# Patient Record
Sex: MALE | NOT HISPANIC OR LATINO | Employment: FULL TIME | URBAN - METROPOLITAN AREA
[De-identification: names, ages, dates, MRNs, and addresses within clinical notes are randomized per-mention and may not be internally consistent; named-entity substitution may affect disease eponyms.]

---

## 2020-09-03 ENCOUNTER — HOSPITAL ENCOUNTER (OUTPATIENT)
Dept: OTHER | Facility: HOSPITAL | Age: 20
Discharge: HOME | End: 2020-09-03
Attending: FAMILY MEDICINE

## 2020-09-03 ENCOUNTER — CONVERSION ENCOUNTER (OUTPATIENT)
Dept: FAMILY MEDICINE CLINIC | Age: 20
End: 2020-09-03

## 2020-09-06 LAB — SARS-COV-2 RNA SPEC QL NAA+PROBE: DETECTED

## 2021-03-02 ENCOUNTER — OFFICE VISIT CONVERTED (OUTPATIENT)
Dept: FAMILY MEDICINE CLINIC | Age: 21
End: 2021-03-02
Attending: FAMILY MEDICINE

## 2021-03-02 ENCOUNTER — HOSPITAL ENCOUNTER (OUTPATIENT)
Dept: OTHER | Facility: HOSPITAL | Age: 21
Discharge: HOME | End: 2021-03-02
Attending: FAMILY MEDICINE

## 2021-03-02 LAB
ALBUMIN SERPL-MCNC: 4.7 G/DL (ref 3.5–5)
ALBUMIN/GLOB SERPL: 1.7 {RATIO} (ref 1.4–2.6)
ALP SERPL-CCNC: 83 U/L (ref 53–128)
ALT SERPL-CCNC: 19 U/L (ref 10–40)
ANION GAP SERPL CALC-SCNC: 16 MMOL/L (ref 8–19)
AST SERPL-CCNC: 18 U/L (ref 15–50)
BILIRUB SERPL-MCNC: 0.39 MG/DL (ref 0.2–1.3)
BUN SERPL-MCNC: 7 MG/DL (ref 5–25)
BUN/CREAT SERPL: 7 {RATIO} (ref 6–20)
CALCIUM SERPL-MCNC: 9.4 MG/DL (ref 8.7–10.4)
CHLORIDE SERPL-SCNC: 105 MMOL/L (ref 99–111)
CONV CO2: 27 MMOL/L (ref 22–32)
CONV TOTAL PROTEIN: 7.4 G/DL (ref 6.3–8.2)
CREAT UR-MCNC: 0.94 MG/DL (ref 0.7–1.2)
ERYTHROCYTE [DISTWIDTH] IN BLOOD BY AUTOMATED COUNT: 11.9 % (ref 11.6–14.4)
GFR SERPLBLD BASED ON 1.73 SQ M-ARVRAT: >60 ML/MIN/{1.73_M2}
GLOBULIN UR ELPH-MCNC: 2.7 G/DL (ref 2–3.5)
GLUCOSE SERPL-MCNC: 104 MG/DL (ref 70–99)
HCT VFR BLD AUTO: 45.3 % (ref 42–52)
HGB BLD-MCNC: 15.3 G/DL (ref 14–18)
MCH RBC QN AUTO: 29.7 PG (ref 27–31)
MCHC RBC AUTO-ENTMCNC: 33.8 G/DL (ref 33–37)
MCV RBC AUTO: 88 FL (ref 80–96)
OSMOLALITY SERPL CALC.SUM OF ELEC: 296 MOSM/KG (ref 273–304)
PLATELET # BLD AUTO: 323 10*3/UL (ref 130–400)
PMV BLD AUTO: 10 FL (ref 9.4–12.4)
POTASSIUM SERPL-SCNC: 4.1 MMOL/L (ref 3.5–5.3)
RBC # BLD AUTO: 5.15 10*6/UL (ref 4.7–6.1)
SODIUM SERPL-SCNC: 144 MMOL/L (ref 135–147)
TSH SERPL-ACNC: 1.16 M[IU]/L (ref 0.27–4.2)
WBC # BLD AUTO: 6.27 10*3/UL (ref 4.8–10.8)

## 2021-05-18 NOTE — PROGRESS NOTES
Geoffrey Jimenez Nuno  2000     Office/Outpatient Visit    Visit Date: Tue, Mar 2, 2021 04:02 pm    Provider: Cyrus Lobato MD (Assistant: Roger Stevenson, )    Location: [facility]        Electronically signed by Cyrus Lobato MD on  03/04/2021 02:36:49 PM                             Subjective:        CC: Mr. Jimenez is a 21 year old White male.  This is his first visit to the clinic.  depression for a long time         HPI:           PHQ-9 Depression Screening: Completed form scanned and in chart; Total Score 6           In regard to the dysthymic disorder, the diagnosis of depression was made several years ago.  Presently, he feels a moderate degree of depression.  Current affective symptoms include anhedonia.  Presently, Mr. Jimenez denies suicidal ideation.  STATES HE HAS FELT THIS WAY FOR A LONG TIME BUT HAS NEVER SOUGHT TREATMENT     ROS:     CONSTITUTIONAL:  Positive for fatigue.   Negative for chills or fever.      E/N/T:  Negative for ear pain, nasal congestion and sore throat.      CARDIOVASCULAR:  Negative for chest pain and palpitations.      RESPIRATORY:  Negative for recent cough and dyspnea.      GASTROINTESTINAL:  Negative for abdominal pain, nausea and vomiting.      MUSCULOSKELETAL:  Negative for myalgias.      NEUROLOGICAL:  Negative for headaches.          Past Medical History / Family History / Social History:         Last Reviewed on 3/04/2021 02:20 PM by Cyrus Lobato    Past Medical History:             PAST MEDICAL HISTORY     UNREMARKABLE         Surgical History:     NONE         Family History:     Family Medical History Unremarkable         Social History:     Occupation: CONSTRUCTION WORK;         Tobacco/Alcohol/Supplements:     Last Reviewed on 3/04/2021 02:20 PM by Cyrus Lobato    Tobacco: He has never smoked.          Alcohol: Frequency: Just on weekends When he drinks, the average quantity of alcohol is 7-8 drinks.       Caffeine:  He admits to consuming caffeine via -LITTLE.          Substance Abuse History:     Last Reviewed on 3/04/2021 02:20 PM by Cyrus Lobato    None         Mental Health History:     Last Reviewed on 3/07/2016 02:56 PM by Laura Dietrich        Communicable Diseases (eg STDs):     Last Reviewed on 3/07/2016 02:56 PM by Laura Dietrich        Current Problems:     Last Reviewed on 3/04/2021 02:20 PM by Cyrus Lobato    Dysthymic disorder    Other fatigue    Encounter for screening for depression        Immunizations:     DTaP 1998    DTaP 1998    DTaP 9/15/1999    DTaP 1998    DTaP 2/14/2003    Hib HbOC (4-dose) 1998    Hib HbOC (4-dose) 1998    Hib HbOC (4-dose) 9/15/1999    Hib HbOC (4-dose) 1998    Hep B (pedi/adol, 3-dose schedule) 1998    Hep B (pedi/adol, 3-dose schedule) 1998    Hep B (pedi/adol, 3-dose schedule) 1998    IPV  Poliovirus, inactivated 1998    IPV  Poliovirus, inactivated 1998    IPV  Poliovirus, inactivated 1/11/1999    IPV  Poliovirus, inactivated 2/14/2003    MMR  (Measles-Mumps-Rubella), live 1/11/1999    MMR  (Measles-Mumps-Rubella), live 2/14/2003    Varicella, live 1/11/1999    Menactra (Meningococcal MCV4P) 2/5/2010    Tdap (Tetanus, reduced diph, acellular pertussis) 2/5/2010        Allergies:     Last Reviewed on 3/04/2021 02:20 PM by Cyrus Lobato    No Known Allergies.        Current Medications:     Last Reviewed on 3/04/2021 02:20 PM by Cyrus Lobato    No Known Medications.        Objective:        Vitals:         Current: 3/2/2021 4:09:58 PM    Ht:  5 ft, 9.25 in;  Wt: 193.8 lbs;  BMI: 28.4T: 97.3 F (temporal);  BP: 162/89 mm Hg (left arm, sitting);  P: 72 bpm (left arm (BP Cuff), sitting)        Repeat:     4:11:13 PM  BP:   163/93mm Hg (left arm, sitting) 4:11:21 PM  P:   75bpm (left arm (BP Cuff), sitting)     Exams:     PHYSICAL EXAM:     GENERAL: Vitals recorded well developed, well nourished;   well groomed;  no apparent distress;     NECK: trachea is midline; thyroid is non-palpable;     RESPIRATORY: normal respiratory rate and pattern with no distress; normal breath sounds with no rales, rhonchi, wheezes or rubs;     CARDIOVASCULAR: normal rate; rhythm is regular;  no systolic murmur; no edema;     LYMPHATIC: no enlargement of cervical or facial nodes; no supraclavicular nodes;     NEUROLOGIC: GROSSLY INTACT     PSYCHIATRIC:  appropriate affect and demeanor; normal speech pattern; grossly normal memory;         Assessment:         F34.1   Dysthymic disorder       R53.83   Other fatigue       R03.0   Elevated blood-pressure reading, without diagnosis of hypertension       Z13.31   Encounter for screening for depression           ORDERS:         Meds Prescribed:       [New Rx] sertraline 50 mg oral tablet [take 1 tablet (50 mg) by oral route once daily], #30 (thirty) tablets, Refills: 0 (zero)         Lab Orders:       [id number]  [facility] CBC w/o diff  (Send-Out)            [id number]  COMP - [facility] Comp. Metabolic Panel  (Send-Out)            [id number]  TSH - [facility] TSH  (Send-Out)              Other Orders:         Depression screen positive and follow up plan documented  (In-House)                      Plan:         Dysthymic disorder        MEDICATIONS: NEW prescriptions an antidepressant     FOLLOW-UP: Schedule a follow-up visit in 3 months.      CONSIDER REFERRAL FOR COUNSELING, DECLINES AT THIS TIME MIPS Positive Depression Screen: Suicide Risk Assessment completed--denies suicidal/homicidal ideation; Pharmacologic intervention initiated/modified           Prescriptions:       [New Rx] sertraline 50 mg oral tablet [take 1 tablet (50 mg) by oral route once daily], #30 (thirty) tablets, Refills: 0 (zero)           Orders:         Depression screen positive and follow up plan documented  (In-House)              Other fatigue    LABORATORY:  Labs ordered to be performed today include CBC W/O DIFF,  Comprehensive metabolic panel, and TSH.            Orders:       [id number]  [facility] - [facility] CBC w/o diff  (Send-Out)            [id number]  COMP - [facility] Comp. Metabolic Panel  (Send-Out)            15142  TSH - [facility] TSH  (Send-Out)              Elevated blood-pressure reading, without diagnosis of hypertension        WILL RECHECK AT NEXT VISIT             Patient Recommendations:        For  Dysthymic disorder:    Schedule a follow-up visit in 3 months.          For  Elevated blood-pressure reading, without diagnosis of hypertension:    I also recommend WILL RECHECK AT NEXT VISIT.              Charge Capture:         Primary Diagnosis:     F34.1  Dysthymic disorder           Orders:      60160  Office visit - new pt, level 3  (In-House)              Depression screen positive and follow up plan documented  (In-House)              R53.83  Other fatigue     R03.0  Elevated blood-pressure reading, without diagnosis of hypertension     Z13.31  Encounter for screening for depression         ADDENDUMS:      ____________________________________    Addendum: 03/07/2021 03:46 PM - Cyrus Lobato        ADD [id number], REMOVE [id number]

## 2021-07-02 VITALS — HEIGHT: 71 IN | TEMPERATURE: 101 F

## 2021-07-02 VITALS
DIASTOLIC BLOOD PRESSURE: 93 MMHG | WEIGHT: 193.8 LBS | HEIGHT: 69 IN | SYSTOLIC BLOOD PRESSURE: 163 MMHG | BODY MASS INDEX: 28.71 KG/M2 | HEART RATE: 75 BPM | TEMPERATURE: 97.3 F

## 2021-08-11 ENCOUNTER — OFFICE VISIT (OUTPATIENT)
Dept: FAMILY MEDICINE CLINIC | Age: 21
End: 2021-08-11

## 2021-08-11 VITALS
SYSTOLIC BLOOD PRESSURE: 157 MMHG | DIASTOLIC BLOOD PRESSURE: 90 MMHG | HEIGHT: 70 IN | WEIGHT: 207.6 LBS | HEART RATE: 77 BPM | BODY MASS INDEX: 29.72 KG/M2

## 2021-08-11 DIAGNOSIS — Z00.00 ENCOUNTER FOR GENERAL ADULT MEDICAL EXAMINATION WITHOUT ABNORMAL FINDINGS: Primary | ICD-10-CM

## 2021-08-11 PROCEDURE — 99395 PREV VISIT EST AGE 18-39: CPT | Performed by: FAMILY MEDICINE

## 2021-08-11 NOTE — PROGRESS NOTES
"Chief Complaint  Anxiety (MED FOLLOW UP )    Subjective          Geoffrey Jimenez presents to [facility]  ANNUAL EXAM:  LAST EXAM ONE YEAR AGO.  DOES NOT SMOKE.  ANXIETY IS UNDER GOOD CONTROL ON CURRENT MEDS.  HIS FATIGUE IMPROVED.          No Known Allergies     Health Maintenance Due   Topic Date Due   • ANNUAL PHYSICAL  Never done   • COVID-19 Vaccine (1) Never done   • TDAP/TD VACCINES (2 - Td or Tdap) 02/05/2020   • HEPATITIS C SCREENING  Never done        Current Outpatient Medications on File Prior to Visit   Medication Sig   • sertraline (ZOLOFT) 50 MG tablet TAKE 1 TABLET(50 MG) BY MOUTH EVERY DAY     No current facility-administered medications on file prior to visit.       Immunization History   Administered Date(s) Administered   • DTaP 1998, 1998, 1998, 09/15/1999, 02/14/2003   • Hepatitis B 1998, 1998, 1998   • HiB 1998, 1998, 1998, 09/15/1999   • IPV 1998, 1998, 01/11/1999, 02/14/2003   • MMR 01/11/1999, 02/14/2003   • Meningococcal MCV4P (Menactra) 02/05/2010   • Tdap 02/05/2010   • Varicella 01/11/1999       Review of Systems   Constitutional: Negative for appetite change, chills, fatigue and fever.   HENT: Negative for ear pain, hearing loss, rhinorrhea and sore throat.    Eyes: Negative for blurred vision and discharge.   Respiratory: Negative for cough and shortness of breath.    Cardiovascular: Negative for chest pain, palpitations and leg swelling.   Gastrointestinal: Negative for abdominal pain, nausea and vomiting.   Genitourinary: Negative for dysuria and hematuria.   Musculoskeletal: Negative for arthralgias and myalgias.   Neurological: Negative for headache.   Psychiatric/Behavioral: Negative for depressed mood.        Objective     /90 (BP Location: Right arm, Patient Position: Sitting)   Pulse 77   Ht 176.5 cm (69.5\")   Wt 94.2 kg (207 lb 9.6 oz)   BMI 30.22 kg/m²   "     Physical Exam  Vitals and nursing note reviewed.   Constitutional:       General: He is not in acute distress.     Appearance: Normal appearance.   HENT:      Right Ear: Tympanic membrane normal.      Left Ear: Tympanic membrane normal.      Mouth/Throat:      Pharynx: Oropharynx is clear.   Eyes:      Conjunctiva/sclera: Conjunctivae normal.   Cardiovascular:      Rate and Rhythm: Normal rate and regular rhythm.      Heart sounds: No murmur heard.     Pulmonary:      Effort: Pulmonary effort is normal.      Breath sounds: Normal breath sounds.   Abdominal:      Palpations: Abdomen is soft.      Tenderness: There is no abdominal tenderness.   Musculoskeletal:         General: No swelling.      Cervical back: Neck supple.   Lymphadenopathy:      Cervical: No cervical adenopathy.   Neurological:      General: No focal deficit present.      Mental Status: He is alert.      Cranial Nerves: No cranial nerve deficit.      Coordination: Coordination normal.      Gait: Gait normal.   Psychiatric:         Mood and Affect: Mood normal.         Behavior: Behavior normal.         Result Review :                             Assessment and Plan      Diagnoses and all orders for this visit:    1. Encounter for general adult medical examination without abnormal findings (Primary)  Assessment & Plan:  ADVICE GIVEN RE:  ALCOHOL USE, SEATBELT USE, REGULAR EXERCISE, HEALTHY DIET, REGULAR DENTAL EXAMS             Follow Up     Return in about 1 year (around 8/11/2022).    Patient was given instructions and counseling regarding his condition or for health maintenance advice. Please see specific information pulled into the AVS if appropriate.

## 2021-08-15 PROBLEM — Z00.00 ENCOUNTER FOR GENERAL ADULT MEDICAL EXAMINATION WITHOUT ABNORMAL FINDINGS: Status: ACTIVE | Noted: 2021-08-15

## 2021-08-15 PROBLEM — F41.1 GENERALIZED ANXIETY DISORDER: Status: ACTIVE | Noted: 2021-08-15

## 2021-08-15 NOTE — ASSESSMENT & PLAN NOTE
ADVICE GIVEN RE:  ALCOHOL USE, SEATBELT USE, REGULAR EXERCISE, HEALTHY DIET, REGULAR DENTAL EXAMS

## 2021-09-07 NOTE — TELEPHONE ENCOUNTER
Rx Refill Note  Requested Prescriptions     Pending Prescriptions Disp Refills   • sertraline (ZOLOFT) 50 MG tablet [Pharmacy Med Name: SERTRALINE 50MG TABLETS] 30 tablet 1     Sig: TAKE 1 TABLET(50 MG) BY MOUTH EVERY DAY      Last office visit with prescribing clinician: 8/11/2021      Next office visit with prescribing clinician: 2/11/2022          {TIP  Is Refill Pharmacy correct?YES  Meme Cr  09/07/21, 13:59 EDT